# Patient Record
Sex: MALE | Race: WHITE | Employment: STUDENT | URBAN - METROPOLITAN AREA
[De-identification: names, ages, dates, MRNs, and addresses within clinical notes are randomized per-mention and may not be internally consistent; named-entity substitution may affect disease eponyms.]

---
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
## 2019-08-22 ENCOUNTER — OFFICE VISIT (OUTPATIENT)
Dept: FAMILY MEDICINE CLINIC | Facility: CLINIC | Age: 19
End: 2019-08-22
Payer: COMMERCIAL

## 2019-08-22 ENCOUNTER — APPOINTMENT (OUTPATIENT)
Dept: LAB | Age: 19
End: 2019-08-22
Attending: FAMILY MEDICINE
Payer: COMMERCIAL

## 2019-08-22 VITALS — HEIGHT: 72.5 IN | WEIGHT: 200 LBS | RESPIRATION RATE: 17 BRPM | BODY MASS INDEX: 26.8 KG/M2

## 2019-08-22 DIAGNOSIS — M99.01 CERVICOTHORACIC SOMATIC DYSFUNCTION: Primary | ICD-10-CM

## 2019-08-22 DIAGNOSIS — E55.9 VITAMIN D INSUFFICIENCY: ICD-10-CM

## 2019-08-22 DIAGNOSIS — M54.6 ACUTE MIDLINE THORACIC BACK PAIN: ICD-10-CM

## 2019-08-22 DIAGNOSIS — M99.03 SOMATIC DYSFUNCTION OF LUMBOSACRAL REGION: ICD-10-CM

## 2019-08-22 PROCEDURE — 98926 OSTEOPATH MANJ 3-4 REGIONS: CPT | Performed by: FAMILY MEDICINE

## 2019-08-22 PROCEDURE — 99204 OFFICE O/P NEW MOD 45 MIN: CPT | Performed by: FAMILY MEDICINE

## 2019-08-22 PROCEDURE — 36415 COLL VENOUS BLD VENIPUNCTURE: CPT

## 2019-08-22 PROCEDURE — 82306 VITAMIN D 25 HYDROXY: CPT

## 2019-08-22 RX ORDER — CYCLOBENZAPRINE HCL 5 MG
5 TABLET ORAL 3 TIMES DAILY PRN
Qty: 30 TABLET | Refills: 0 | Status: SHIPPED | OUTPATIENT
Start: 2019-08-22

## 2019-08-22 NOTE — PATIENT INSTRUCTIONS
OMT done. Encouraged physical fitness and daily physical activity daily (PLAY). Exercises for Shoulder Flexibility: Back Scratch    Improving your flexibility can reduce pain. Stretching exercises also can help increase your range of pain-free motion. side.  · Repeat 10 times. Date Last Reviewed: 3/1/2018  © 9747-8544 The [employer] [address]. 1407 [facility], [facility] [place]. All rights reserved. This information is not intended as a substitute for professional medical care.  Always follow yo let your back sag. Be sure to keep your weight evenly distributed. Don’t sit back on your hips.   · Hold for 5 seconds. · Return to starting position. · Tuck your head and lift (arch) your back. · Hold for 5 seconds  · Return to starting position.   · Re 5401-2513 The [address] [address]. 1407 [facility], [facility] [place]. All rights reserved. This information is not intended as a substitute for professional medical care. Always follow your healthcare professional's instructions.         Back Exer or sagging). Be sure to maintain your neck’s natural curve:  · Extend one leg straight back. Don’t arch your back or let your head or body sag. · Hold for 5 seconds. Return to starting position. · Repeat 5 times.   · Switch legs.   Date Last Reviewed: 3/1 Exercises: Partial Curl-Ups    To start, lie on your back with your knees bent and feet flat on the floor. Don’t press your neck or lower back to the floor. Breathe deeply.  You should feel comfortable and relaxed in this position:  · Cross your arms loosel 8922-2013 The [address]. 1407 [facility], [facility] [place]. All rights reserved. This information is not intended as a substitute for professional medical care. Always follow your healthcare professional's instructions.         Back Exer

## 2019-08-22 NOTE — PROGRESS NOTES
HPI:    Patient ID: Dayana Bravo is a 22year old male.     Patient is a 56-year-old  male who enjoys the sport of hockey and does otherwise heavy lifting when needed is here today with a mid to low specific back pain midline to the left with Lumbar back: He exhibits decreased range of motion, tenderness, pain and spasm. Neurological: He is alert and oriented to person, place, and time. No cranial nerve deficit. ASSESSMENT/PLAN:   1.  Cervicothoracic somatic dysfunction  Oste · Throw one end of a towel over your shoulder. Grab it behind your back with your other hand. · Pull down gently on the towel with your front arm. Let your back arm slide up as high as is comfortable. You’ll feel a stretch in your shoulder.  Hold the stret · Stretch one arm straight out in front of you. Don’t raise your head or let your supporting shoulder sag. · Hold for 5 seconds. · Return to starting position. · Repeat 5 times. · Switch arms.   Date Last Reviewed: 3/1/2018  © 9727-4873 The [employer] Back Exercises: Back Extension with Elbow Press    To start, lie face down on your stomach, feet slightly apart, forehead on the floor. Breathe deeply. You should feel comfortable and relaxed in this position. · Press up on your forearms.  Keep your stomac · Lift one bent knee off the floor 2 to 4 inches. · Hold for 10 seconds. Return to start position. · Repeat 3 times. · Switch legs. Date Last Reviewed: 3/1/2018  © 7332-8645 The [employer] [address]. 1407 [facility], [address] [place] [address].  [facility] Back Exercises: Lower Back Rotation    To start, lie on your back with your knees bent and feet flat on the floor. Don’t press your neck or lower back to the floor. Breathe deeply. You should feel comfortable and relaxed in this position.   · Drop both knee © 6675-1021 The [address]. 1407 [facility], [address] [place]. All rights reserved. This information is not intended as a substitute for professional medical care. Always follow your healthcare professional's instructions.         Back Ex · Stretch your right arm overhead. · Slowly bend to the left. Don’t twist your torso. Stay within your pain limits. · Hold for 20 seconds. Return to starting position. · Repeat 2 to 5 times. Then, switch to the other side.   Date Last Reviewed: 3/1/2018

## 2019-08-23 LAB — 25(OH)D3 SERPL-MCNC: 76.7 NG/ML (ref 30–100)

## 2020-05-15 ENCOUNTER — APPOINTMENT (OUTPATIENT)
Dept: OTHER | Facility: HOSPITAL | Age: 20
End: 2020-05-15
Attending: EMERGENCY MEDICINE

## 2020-12-09 ENCOUNTER — TELEPHONE (OUTPATIENT)
Dept: FAMILY MEDICINE CLINIC | Facility: CLINIC | Age: 20
End: 2020-12-09

## 2020-12-09 NOTE — TELEPHONE ENCOUNTER
Verified name and  of patient. Mother of patient calling to report that patient has sore throat and fever of 101.3 degrees Fahrenheit. Mother of patient asking about testing process- [facility] testing process explained.  She states she will have him stefanie

## 2020-12-10 ENCOUNTER — TELEMEDICINE (OUTPATIENT)
Dept: FAMILY MEDICINE CLINIC | Facility: CLINIC | Age: 20
End: 2020-12-10
Payer: COMMERCIAL

## 2020-12-10 ENCOUNTER — LAB ENCOUNTER (OUTPATIENT)
Dept: LAB | Age: 20
End: 2020-12-10
Attending: FAMILY MEDICINE
Payer: COMMERCIAL

## 2020-12-10 DIAGNOSIS — J02.9 SORE THROAT: ICD-10-CM

## 2020-12-10 DIAGNOSIS — R50.9 FEVER, UNSPECIFIED FEVER CAUSE: Primary | ICD-10-CM

## 2020-12-10 DIAGNOSIS — R50.9 FEVER, UNSPECIFIED FEVER CAUSE: ICD-10-CM

## 2020-12-10 PROCEDURE — 99213 OFFICE O/P EST LOW 20 MIN: CPT | Performed by: FAMILY MEDICINE

## 2020-12-10 RX ORDER — PENICILLIN V POTASSIUM 500 MG/1
500 TABLET ORAL 3 TIMES DAILY
Qty: 30 TABLET | Refills: 0 | Status: SHIPPED | OUTPATIENT
Start: 2020-12-10 | End: 2020-12-20

## 2020-12-10 NOTE — PROGRESS NOTES
HPI:    Patient ID: Elier Lares is a 23year old male. This visit is conducted using Telemedicine with live, interactive video and audio.     Patient has been referred to the [facility] website at [email or web address] to review the yearly Consent to instructions and side effects. Call if not improved in 3 days, sooner if worsening.   Orders Placed This Encounter      COVID-19 Testing [E]      Meds This Visit:  Requested Prescriptions     Signed Prescriptions Disp Refills   • penicillin v potassium 500

## 2020-12-28 ENCOUNTER — TELEPHONE (OUTPATIENT)
Dept: FAMILY MEDICINE CLINIC | Facility: CLINIC | Age: 20
End: 2020-12-28

## 2020-12-28 NOTE — TELEPHONE ENCOUNTER
Pt states recently had video visit with Dr Chandni Evans on 12/10/20. He was prescribed antibiotics for sore throat and pt states he didn't take them as directed and finished them 12/25/20.  He states he feels as though his symptoms are returning and c/o margoa

## 2020-12-29 ENCOUNTER — HOSPITAL ENCOUNTER (OUTPATIENT)
Age: 20
Discharge: HOME | End: 2020-12-29
Payer: COMMERCIAL

## 2020-12-29 VITALS
DIASTOLIC BLOOD PRESSURE: 61 MMHG | SYSTOLIC BLOOD PRESSURE: 124 MMHG | RESPIRATION RATE: 21 BRPM | HEART RATE: 77 BPM | TEMPERATURE: 99 F | OXYGEN SATURATION: 99 %

## 2020-12-29 DIAGNOSIS — J02.0 STREPTOCOCCAL SORE THROAT: Primary | ICD-10-CM

## 2020-12-29 PROCEDURE — 99202 OFFICE O/P NEW SF 15 MIN: CPT | Performed by: EMERGENCY MEDICINE

## 2020-12-29 PROCEDURE — 87880 STREP A ASSAY W/OPTIC: CPT | Performed by: EMERGENCY MEDICINE

## 2020-12-29 RX ORDER — DEXAMETHASONE SODIUM PHOSPHATE 10 MG/ML
10 INJECTION, SOLUTION INTRAMUSCULAR; INTRAVENOUS ONCE
Status: COMPLETED | OUTPATIENT
Start: 2020-12-29 | End: 2020-12-29

## 2020-12-29 RX ORDER — CEFDINIR 300 MG/1
300 CAPSULE ORAL 2 TIMES DAILY
Qty: 20 CAPSULE | Refills: 0 | Status: SHIPPED | OUTPATIENT
Start: 2020-12-29 | End: 2021-01-08

## 2020-12-29 NOTE — ED PROVIDER NOTES
Patient Seen in: [facility]      History   Patient presents with:  Sore Throat    Stated Complaint: SORE THROAT    Jannie Gonzalez is a 23year old  male here for sore throat that started Sunday.   Patient was previously on a 10-day course 98.6 °F (37 °C) (Temporal)   Resp 21   SpO2 99%         Physical Exam  Vitals signs and nursing note reviewed. Constitutional:       General: He is not in acute distress. Appearance: Normal appearance. He is well-developed. He is not toxic-appearing. ED Course     Labs Reviewed   [facility] POCT RAPID STREP - Abnormal; Notable for the following components:       Result Value    POCT Rapid Strep Positive (*)     All other components within normal limits                    [facility]      [facility]

## 2021-08-06 ENCOUNTER — HOSPITAL ENCOUNTER (OUTPATIENT)
Age: 21
Discharge: HOME | End: 2021-08-06
Payer: COMMERCIAL

## 2021-08-06 VITALS
SYSTOLIC BLOOD PRESSURE: 130 MMHG | DIASTOLIC BLOOD PRESSURE: 70 MMHG | TEMPERATURE: 98 F | BODY MASS INDEX: 26.51 KG/M2 | HEIGHT: 73 IN | RESPIRATION RATE: 18 BRPM | WEIGHT: 200 LBS | OXYGEN SATURATION: 98 % | HEART RATE: 77 BPM

## 2021-08-06 DIAGNOSIS — B34.9 VIRAL ILLNESS: Primary | ICD-10-CM

## 2021-08-06 DIAGNOSIS — Z11.52 ENCOUNTER FOR SCREENING FOR COVID-19: ICD-10-CM

## 2021-08-06 DIAGNOSIS — Z20.822 LAB TEST NEGATIVE FOR COVID-19 VIRUS: ICD-10-CM

## 2021-08-06 LAB
S PYO AG THROAT QL: NEGATIVE
SARS-COV-2 RNA RESP QL NAA+PROBE: NOT DETECTED

## 2021-08-06 PROCEDURE — 99213 OFFICE O/P EST LOW 20 MIN: CPT | Performed by: NURSE PRACTITIONER

## 2021-08-06 PROCEDURE — 87880 STREP A ASSAY W/OPTIC: CPT | Performed by: NURSE PRACTITIONER

## 2021-08-06 PROCEDURE — U0002 COVID-19 LAB TEST NON-CDC: HCPCS | Performed by: NURSE PRACTITIONER

## 2021-08-06 NOTE — ED PROVIDER NOTES
Patient Seen in: [facility]      History   Patient presents with:  Testing    Stated Complaint: covid contact/test    HPI/Subjective:   Well-appearing 18-year-old male presents with complaints of nasal congestion, body aches, and a sore thro normal limits for this patient. General: Patient is awake and alert, oriented to person, place and time. Pt appears non-toxic. HEENT: Head is normocephalic, atraumatic. No conjunctival injection. No facial droop or slurred speech.   Mucous membranes discussed with patient. Discussed close PMD follow-up as well as return/ED precautions.   Disposition and Plan     Clinical Impression:  Viral illness  (primary encounter diagnosis)  Encounter for screening for COVID-19  Lab test negative for COVID-19 virus

## 2022-08-07 NOTE — ED INITIAL ASSESSMENT (HPI)
Pt here for c/o sore throat that started Sunday night. Pain is worse with swallowing. Pt was on a 10 day course of PCN for sore throat. Pt admits to not taking it as directed. Denies fever. PAIN

## 2023-08-10 ENCOUNTER — HOSPITAL ENCOUNTER (OUTPATIENT)
Age: 23
Discharge: HOME | End: 2023-08-10
Payer: COMMERCIAL

## 2023-08-10 VITALS
RESPIRATION RATE: 20 BRPM | TEMPERATURE: 98 F | SYSTOLIC BLOOD PRESSURE: 130 MMHG | OXYGEN SATURATION: 98 % | DIASTOLIC BLOOD PRESSURE: 68 MMHG | HEART RATE: 52 BPM

## 2023-08-10 DIAGNOSIS — B34.9 VIRAL ILLNESS: Primary | ICD-10-CM

## 2023-08-10 LAB — S PYO AG THROAT QL: NEGATIVE

## 2023-08-10 PROCEDURE — 99213 OFFICE O/P EST LOW 20 MIN: CPT | Performed by: NURSE PRACTITIONER

## 2023-08-10 PROCEDURE — 87880 STREP A ASSAY W/OPTIC: CPT | Performed by: NURSE PRACTITIONER

## 2023-08-10 NOTE — DISCHARGE INSTRUCTIONS
You are negative for strep throat. This is likely a viral illness, supportive and symptomatic treatment at home. Drink plenty of water, electrolytes, stay well-hydrated. Rest and conserve your energy. Take Tylenol every 4 hours Motrin every 6 hours for fever, headache, body aches. Over-the-counter cold medication as needed. I recommend sleeping somewhat elevated and upright. Sleep with humidifier. Steam showers for cough and congestion. If you have no improvement of your symptoms in 5 to 7 days, please follow-up with your primary care doctor.   If you have any new or worsening symptoms such as chest pain, dizziness, lightheadedness, palpitations, shortness of breath, go to ER

## 2023-08-10 NOTE — ED INITIAL ASSESSMENT (HPI)
Pt came in  due to headache, sore throat, congestion and chills for the past 2 days. Pt has easy non labored respirations.

## 2024-10-13 ENCOUNTER — HOSPITAL ENCOUNTER (OUTPATIENT)
Age: 24
Discharge: HOME | End: 2024-10-13
Payer: COMMERCIAL

## 2024-10-13 VITALS
OXYGEN SATURATION: 98 % | SYSTOLIC BLOOD PRESSURE: 131 MMHG | TEMPERATURE: 98 F | HEART RATE: 95 BPM | RESPIRATION RATE: 20 BRPM | DIASTOLIC BLOOD PRESSURE: 88 MMHG

## 2024-10-13 DIAGNOSIS — Z11.3 SCREENING EXAMINATION FOR STD (SEXUALLY TRANSMITTED DISEASE): Primary | ICD-10-CM

## 2024-10-13 LAB
GLUCOSE UR STRIP-MCNC: NEGATIVE MG/DL
HGB UR QL STRIP: NEGATIVE
KETONES UR STRIP-MCNC: >=160 MG/DL
LEUKOCYTE ESTERASE UR QL STRIP: NEGATIVE
NITRITE UR QL STRIP: NEGATIVE
PH UR STRIP: 6 [PH]
PROT UR STRIP-MCNC: 30 MG/DL
SP GR UR STRIP: >=1.03
UROBILINOGEN UR STRIP-ACNC: <2 MG/DL

## 2024-10-13 PROCEDURE — 87591 N.GONORRHOEAE DNA AMP PROB: CPT | Performed by: NURSE PRACTITIONER

## 2024-10-13 PROCEDURE — 87491 CHLMYD TRACH DNA AMP PROBE: CPT | Performed by: NURSE PRACTITIONER

## 2024-10-13 NOTE — ED PROVIDER NOTES
Patient Seen in: [facility] [place]      History     Chief Complaint   Patient presents with    Eval-G     Stated Complaint: STD Screening    Subjective: This is a 24-year-old male, past medical history of depression, presents to immediate care clinic requesting STD screening.  Patient states he recently got out of a monogamous relationship with a female.  Recent unprotected sexual intercourse last week with same female.  However, before beginning a new relationship he would like to have STD screening.  He is asymptomatic.  AOx4  The history is provided by the patient.             Objective:     Past Medical History:    Depression              History reviewed. No pertinent surgical history.             Social History     Socioeconomic History    Marital status: Single   Tobacco Use    Smoking status: Never    Smokeless tobacco: Never   Substance and Sexual Activity    Alcohol use: No     Comment: spring break    Drug use: Yes     Types: Cannabis     Comment: marijuana use, reports last use 3/21/17, smoking for 2 years              Review of Systems   All other systems reviewed and are negative.      Positive for stated complaint: STD Screening  Other systems are as noted in HPI.  Constitutional and vital signs reviewed.      All other systems reviewed and negative except as noted above.    Physical Exam     ED Triage Vitals [10/13/24 1008]   /88   Pulse 95   Resp 20   Temp 97.9 °F (36.6 °C)   Temp src Temporal   SpO2 98 %   O2 Device None (Room air)       Current Vitals:   Vital Signs  BP: 131/88  Pulse: 95  Resp: 20  Temp: 97.9 °F (36.6 °C)  Temp src: Temporal    Oxygen Therapy  SpO2: 98 %  O2 Device: None (Room air)        Physical Exam  Vitals and nursing note reviewed.   Constitutional:       General: He is not in acute distress.     Appearance: Normal appearance. He is not ill-appearing.   Cardiovascular:      Rate and Rhythm: Normal rate.      Pulses: Normal pulses.   Pulmonary:      Effort:  Pulmonary effort is normal.   Genitourinary:     Comments: Deferred, patient is asymptomatic  Musculoskeletal:         General: Normal range of motion.   Skin:     General: Skin is warm.      Capillary Refill: Capillary refill takes less than 2 seconds.   Neurological:      General: No focal deficit present.      Mental Status: He is alert and oriented to person, place, and time.             ED Course     Labs Reviewed   [facility] POCT URINALYSIS DIPSTICK - Abnormal; Notable for the following components:       Result Value    Urine Clarity Slightly cloudy (*)     Protein urine 30 (*)     Ketone, Urine >=160 (*)     Bilirubin, Urine Moderate (*)     All other components within normal limits   CHLAMYDIA/GONOCOCCUS, DAVID                   MDM      Patient presents to immediate care requesting STD screening.  He recently got out of a relationship, and prior to beginning a new relationship he would like to get tested.    Patient has no symptoms.  He denies any urinary frequency, burning, urgency, retention, hematuria.  No fever, chills, fatigue.  No nausea or vomiting.    He does not have a history of STDs.  Does not know the status of his partner previously states he was told that she has HPV.     Patient is aware of limitations of immediate care and inability to do full STD panel screening due to inability to send out blood work.  Also reassured patient is asymptomatic.  However, he can follow-up with his primary care doctor to request full panel STD testing.  He is agreeable with this plan of care.  He is also agreeable to have urine obtained in immediate care both urinalysis and send out GC.    Patient urine obtained immediate care does show positive ketones with bilirubin and protein.  Patient denies dehydration.  He does not verbalize any recent nausea, vomiting, diarrhea or other fluid loss.  He admittedly does not drink a lot of water.  He states he is trying to \"be better\".  He does drink a lot of caffeine and  supplementation of water.  Patient states he probably drinks less than a water bottle a day.     Patient is aware to increase fluids and electrolytes.  He verbalizes understanding agrees with plan of care.        Medical Decision Making      Disposition and Plan     Clinical Impression:  1. Screening examination for STD (sexually transmitted disease)         Disposition:  Discharge  10/13/2024 11:25 am    Follow-up:  Emmanuel Obando, DO  [address] [place] [address] [id number] [phone]    Schedule an appointment as soon as possible for a visit             Medications Prescribed:  Discharge Medication List as of 10/13/2024 11:28 AM              Supplementary Documentation:

## 2024-10-13 NOTE — DISCHARGE INSTRUCTIONS
As discussed, please drink more water/electrolytes.  It does appear that you may be chronically dehydrated on your urine.  However, there is no visible bacteria.  You should be aiming to drink 80 to 100 ounces of water a day.  Limit intake of caffeine, energy drinks, alcohol, soda, sugary drinks etc.    The results of the send out urine test will be available in about 3 days.  Some will contact you regarding your results.    Please follow-up with your primary care doctor for additional blood work/send out testing as previously discussed.

## 2024-10-14 LAB
C TRACH DNA SPEC QL NAA+PROBE: NEGATIVE
N GONORRHOEA DNA SPEC QL NAA+PROBE: NEGATIVE

## 2024-10-17 ENCOUNTER — LAB ENCOUNTER (OUTPATIENT)
Dept: LAB | Age: 24
End: 2024-10-17
Attending: FAMILY MEDICINE
Payer: COMMERCIAL

## 2024-10-17 ENCOUNTER — OFFICE VISIT (OUTPATIENT)
Dept: FAMILY MEDICINE CLINIC | Facility: CLINIC | Age: 24
End: 2024-10-17

## 2024-10-17 VITALS
TEMPERATURE: 98 F | WEIGHT: 238 LBS | BODY MASS INDEX: 32.23 KG/M2 | DIASTOLIC BLOOD PRESSURE: 83 MMHG | HEART RATE: 55 BPM | SYSTOLIC BLOOD PRESSURE: 130 MMHG | RESPIRATION RATE: 16 BRPM | HEIGHT: 72.24 IN

## 2024-10-17 DIAGNOSIS — F32.A DEPRESSION, UNSPECIFIED DEPRESSION TYPE: ICD-10-CM

## 2024-10-17 DIAGNOSIS — Z00.00 ROUTINE PHYSICAL EXAMINATION: ICD-10-CM

## 2024-10-17 DIAGNOSIS — Z00.00 ROUTINE PHYSICAL EXAMINATION: Primary | ICD-10-CM

## 2024-10-17 LAB
ALBUMIN SERPL-MCNC: 4.9 G/DL (ref 3.2–4.8)
ALBUMIN/GLOB SERPL: 1.8 {RATIO} (ref 1–2)
ALP LIVER SERPL-CCNC: 47 U/L
ALT SERPL-CCNC: 22 U/L
ANION GAP SERPL CALC-SCNC: 9 MMOL/L (ref 0–18)
AST SERPL-CCNC: 26 U/L (ref ?–34)
BILIRUB SERPL-MCNC: 0.8 MG/DL (ref 0.3–1.2)
BUN BLD-MCNC: 8 MG/DL (ref 9–23)
BUN/CREAT SERPL: 6.8 (ref 10–20)
CALCIUM BLD-MCNC: 10.4 MG/DL (ref 8.7–10.4)
CHLORIDE SERPL-SCNC: 108 MMOL/L (ref 98–112)
CHOLEST SERPL-MCNC: 149 MG/DL (ref ?–200)
CO2 SERPL-SCNC: 25 MMOL/L (ref 21–32)
CREAT BLD-MCNC: 1.17 MG/DL
DEPRECATED RDW RBC AUTO: 43.6 FL (ref 35.1–46.3)
EGFRCR SERPLBLD CKD-EPI 2021: 89 ML/MIN/1.73M2 (ref 60–?)
ERYTHROCYTE [DISTWIDTH] IN BLOOD BY AUTOMATED COUNT: 13.2 % (ref 11–15)
FASTING PATIENT LIPID ANSWER: NO
FASTING STATUS PATIENT QL REPORTED: NO
GLOBULIN PLAS-MCNC: 2.7 G/DL (ref 2–3.5)
GLUCOSE BLD-MCNC: 91 MG/DL (ref 70–99)
HAV AB SER QL IA: REACTIVE
HBV CORE AB SERPL QL IA: NONREACTIVE
HBV SURFACE AB SER QL: NONREACTIVE
HBV SURFACE AB SERPL IA-ACNC: <3.1 MIU/ML
HBV SURFACE AG SERPL QL IA: NONREACTIVE
HCT VFR BLD AUTO: 44.2 %
HCV AB SERPL QL IA: NONREACTIVE
HDLC SERPL-MCNC: 56 MG/DL (ref 40–59)
HGB BLD-MCNC: 15.5 G/DL
LDLC SERPL CALC-MCNC: 81 MG/DL (ref ?–100)
MCH RBC QN AUTO: 31.8 PG (ref 26–34)
MCHC RBC AUTO-ENTMCNC: 35.1 G/DL (ref 31–37)
MCV RBC AUTO: 90.6 FL
NONHDLC SERPL-MCNC: 93 MG/DL (ref ?–130)
OSMOLALITY SERPL CALC.SUM OF ELEC: 292 MOSM/KG (ref 275–295)
PLATELET # BLD AUTO: 213 10(3)UL (ref 150–450)
POTASSIUM SERPL-SCNC: 3.8 MMOL/L (ref 3.5–5.1)
PROT SERPL-MCNC: 7.6 G/DL (ref 5.7–8.2)
RBC # BLD AUTO: 4.88 X10(6)UL
SODIUM SERPL-SCNC: 142 MMOL/L (ref 136–145)
T PALLIDUM AB SER QL IA: NONREACTIVE
TRIGL SERPL-MCNC: 61 MG/DL (ref 30–149)
TSI SER-ACNC: 0.68 MIU/ML (ref 0.55–4.78)
VLDLC SERPL CALC-MCNC: 10 MG/DL (ref 0–30)
WBC # BLD AUTO: 4.3 X10(3) UL (ref 4–11)

## 2024-10-17 PROCEDURE — 86803 HEPATITIS C AB TEST: CPT

## 2024-10-17 PROCEDURE — 80053 COMPREHEN METABOLIC PANEL: CPT

## 2024-10-17 PROCEDURE — 86780 TREPONEMA PALLIDUM: CPT

## 2024-10-17 PROCEDURE — 84443 ASSAY THYROID STIM HORMONE: CPT

## 2024-10-17 PROCEDURE — 86704 HEP B CORE ANTIBODY TOTAL: CPT

## 2024-10-17 PROCEDURE — 87389 HIV-1 AG W/HIV-1&-2 AB AG IA: CPT

## 2024-10-17 PROCEDURE — 86709 HEPATITIS A IGM ANTIBODY: CPT

## 2024-10-17 PROCEDURE — 85027 COMPLETE CBC AUTOMATED: CPT

## 2024-10-17 PROCEDURE — 80503 PATH CLIN CONSLTJ SF 5-20: CPT

## 2024-10-17 PROCEDURE — 87340 HEPATITIS B SURFACE AG IA: CPT

## 2024-10-17 PROCEDURE — 86708 HEPATITIS A ANTIBODY: CPT

## 2024-10-17 PROCEDURE — 36415 COLL VENOUS BLD VENIPUNCTURE: CPT

## 2024-10-17 PROCEDURE — 86706 HEP B SURFACE ANTIBODY: CPT

## 2024-10-17 PROCEDURE — 80061 LIPID PANEL: CPT

## 2024-10-17 NOTE — PROGRESS NOTES
HPI:  24 yr old male who presents for physical. Mom is a patient here. Has not been seen in a few years. Used to see Dr. Obando.  Healthy. Single. No children. Living at home and looking for apartment. Part time student.  Hoping to study business. Exercises regularly.     Has had a lengthy mental illness history.  Has been on several medications and multiple admissions. Just finished treatment program in [place].  Told he has depression. ?bipolar. Off medications and feels well. Needs to find new therapist.  Does not see Psychiatrist. Quit alcohol over the past 2 weeks.  Drinks about once per night.     Sexually active.  Has never been tested for STDs. Just had urine sample which was negative for GC/chlamydia. No symptoms.     PMHx: reviewed, see chart  PSHx: reviewed, see chart  All: Patient has no known allergies.   Meds: see chart    ROS:   Allergic/Immuno:  Negative for environmental allergies and food allergies  Cardiovascular:  Negative for chest pain and irregular heartbeat/palpitations  Constitutional:  Negative for decreased activity, fever, irritability and lethargy  Endocrine:  Negative for abnormal sleep patterns, increased activity, polydipsia and polyphagia  ENMT:  Negative for ear drainage, hearing loss and nasal drainage  Eyes:  Negative for eye discharge and vision loss  Gastrointestinal:  Negative for abdominal pain, constipation, decreased appetite, diarrhea and vomiting  Genitourinary:  Negative for dysuria and hematuria  Hema/Lymph:  Negative for easy bleeding and easy bruising  Integumentary:  Negative for pruritus and rash  Musculoskeletal:  Negative for bone/joint symptoms  Neurological:  Negative for gait disturbance  Psychiatric:  Negative for inappropriate interaction and psychiatric symptoms    PE:  /83   Pulse 55   Temp 98 °F (36.7 °C) (Temporal)   Resp 16   Ht 6' 0.24\" (1.835 m)   Wt 238 lb (108 kg)   BMI 32.06 kg/m²   Gen:  Well-nourished.  No distress.  HEENT:  Conjunctive clear.  Flex ear canals clear.  Flex TMs intact with good landmarks noted.  Nares patent.  Oral mucous membrane moist.  Normal lips, teeth, and gums.  Oropharynx normal.  Neck supple.  Good ROM.  No LAD.  Thyroid normal.  CV:  Regular rate and rhythm; no murmurs  Lungs:  Clear to ausculation; good aeration               No wheezes, rales or rhonchi  Abd: soft, non-tender, non-distended          Normal bowel sounds; no masses          No hepatosplenomegaly  Extremities: No cyanosis, clubbing, edema.  Pedal pulses 2+ flex.  MSK:  No abnormalities.  Skin: Warm/dry/intact.  No abnormal moles/lesions noted.  No rashes.  Psych: Orientated to person, place, and time.  Behavior and affect appropriate for age.    A/P:  Encounter Diagnosis   Name Primary?    Routine physical examination Yes     Feels well.  Exercising regularly.  Labs and STD testing done.  Flu shot given.     Depression    Pt seems to be stable right now. Offered BHI resources which he does not feel he needs right now. He will reach out if needed.     Colleen Weiler, DO

## 2024-10-18 LAB — HAV IGM SER QL: NONREACTIVE

## 2024-12-16 ENCOUNTER — HOSPITAL ENCOUNTER (OUTPATIENT)
Age: 24
Discharge: HOME | End: 2024-12-16
Payer: COMMERCIAL

## 2024-12-16 VITALS
RESPIRATION RATE: 20 BRPM | SYSTOLIC BLOOD PRESSURE: 122 MMHG | OXYGEN SATURATION: 98 % | DIASTOLIC BLOOD PRESSURE: 76 MMHG | TEMPERATURE: 98 F | HEART RATE: 53 BPM

## 2024-12-16 DIAGNOSIS — N48.1 BALANITIS: Primary | ICD-10-CM

## 2024-12-16 PROCEDURE — 99213 OFFICE O/P EST LOW 20 MIN: CPT | Performed by: NURSE PRACTITIONER

## 2024-12-16 RX ORDER — BENZOCAINE/MENTHOL 6 MG-10 MG
1 LOZENGE MUCOUS MEMBRANE 3 TIMES DAILY
Qty: 30 G | Refills: 0 | Status: SHIPPED | OUTPATIENT
Start: 2024-12-16 | End: 2024-12-23

## 2024-12-16 NOTE — DISCHARGE INSTRUCTIONS
Use prescribed cream with the antifungal cream you are currently using  Keep area clean and dry  Avoid soaps with fragrance and dyes

## 2024-12-16 NOTE — ED INITIAL ASSESSMENT (HPI)
Pt presents with inflammation and rash to head of penis. Pt has used antifungal cream with no relief.     Symptoms x 1 month. No concern for STI.     Pt reports complete STI testing approx 1 month ago - negative.     Pt is uncircumcised.

## (undated) NOTE — ED AVS SNAPSHOT
[facility] Emergency Department    [address]     [address] [place] [address]    Phone:  [phone]    Fax:  [phone]           Adwoa Villalobos   MRN: [id number]    Department:  [facility] Emergency Department   Date of Visit:  4 and Class Registration line at [phone] or find a doctor online by visiting [email or web address].    IF THERE IS ANY CHANGE OR WORSENING OF YOUR CONDITION, CALL YOUR PRIMARY CARE PHYSICIAN AT ONCE OR RETURN IMMEDIATELY TO [address] [place] [address].     If

## (undated) NOTE — ED AVS SNAPSHOT
[facility] Emergency Department    [address]     [address] [place] [address]    Phone:  [phone]    Fax:  [phone]           Wilian Tobias   MRN: [id number]    Department:  [facility] Emergency Department   Date of Visit:  4 Adolescent Psych IOP - HND with [facility] - [place] PHP/IOP ([address] [facility])    [place] [address]   [phone]            May 08, 2017  8:00 AM   Adolescent Psych IOP - HND with [facility] - [place]   L to a primary care or a specialist physician for a follow-up visit, please tell this physician (or your personal doctor if your instructions are to return to your personal doctor) about any new or lasting problems.  The primary care or specialist physician m Luz  W[employer] ([address]) [address] [facility] [employer]. ([address]) [place] [address] ([facility]) [phone]   [facility] [address] [employer].  [facility] &